# Patient Record
Sex: FEMALE | Race: WHITE | NOT HISPANIC OR LATINO | Employment: UNEMPLOYED | URBAN - NONMETROPOLITAN AREA
[De-identification: names, ages, dates, MRNs, and addresses within clinical notes are randomized per-mention and may not be internally consistent; named-entity substitution may affect disease eponyms.]

---

## 2019-05-15 ENCOUNTER — TRANSCRIBE ORDERS (OUTPATIENT)
Dept: GENERAL RADIOLOGY | Facility: HOSPITAL | Age: 29
End: 2019-05-15

## 2019-05-15 ENCOUNTER — HOSPITAL ENCOUNTER (OUTPATIENT)
Dept: GENERAL RADIOLOGY | Facility: HOSPITAL | Age: 29
Discharge: HOME | End: 2019-05-15
Admitting: PSYCHIATRY & NEUROLOGY

## 2019-05-15 DIAGNOSIS — M54.2 CERVICALGIA: ICD-10-CM

## 2019-05-15 DIAGNOSIS — M54.2 CERVICALGIA: Primary | ICD-10-CM

## 2019-05-15 PROCEDURE — 72050 X-RAY EXAM NECK SPINE 4/5VWS: CPT | Performed by: RADIOLOGY

## 2019-05-15 PROCEDURE — 72050 X-RAY EXAM NECK SPINE 4/5VWS: CPT

## 2021-04-15 ENCOUNTER — HOSPITAL ENCOUNTER (OUTPATIENT)
Dept: GENERAL RADIOLOGY | Facility: HOSPITAL | Age: 31
Discharge: HOME | End: 2021-04-15
Admitting: NURSE PRACTITIONER

## 2021-04-15 ENCOUNTER — TRANSCRIBE ORDERS (OUTPATIENT)
Dept: ADMINISTRATIVE | Facility: HOSPITAL | Age: 31
End: 2021-04-15

## 2021-04-15 DIAGNOSIS — M25.562 LEFT KNEE PAIN, UNSPECIFIED CHRONICITY: Primary | ICD-10-CM

## 2021-04-15 DIAGNOSIS — M25.562 LEFT KNEE PAIN, UNSPECIFIED CHRONICITY: ICD-10-CM

## 2021-04-15 PROCEDURE — 73564 X-RAY EXAM KNEE 4 OR MORE: CPT | Performed by: RADIOLOGY

## 2021-04-15 PROCEDURE — 73564 X-RAY EXAM KNEE 4 OR MORE: CPT

## 2021-05-19 ENCOUNTER — OFFICE VISIT (OUTPATIENT)
Dept: ORTHOPEDIC SURGERY | Facility: CLINIC | Age: 31
End: 2021-05-19

## 2021-05-19 VITALS
TEMPERATURE: 97.7 F | SYSTOLIC BLOOD PRESSURE: 150 MMHG | HEIGHT: 64 IN | DIASTOLIC BLOOD PRESSURE: 107 MMHG | BODY MASS INDEX: 43.71 KG/M2 | WEIGHT: 256 LBS | HEART RATE: 102 BPM

## 2021-05-19 DIAGNOSIS — S83.005A TRAUMATIC SUBLUXATION OF LEFT PATELLOFEMORAL JOINT, INITIAL ENCOUNTER: Primary | ICD-10-CM

## 2021-05-19 PROCEDURE — 99203 OFFICE O/P NEW LOW 30 MIN: CPT | Performed by: PHYSICIAN ASSISTANT

## 2021-05-19 RX ORDER — NAPROXEN 500 MG/1
TABLET ORAL
COMMUNITY
Start: 2021-02-12

## 2021-05-19 RX ORDER — IBUPROFEN 600 MG/1
600 TABLET ORAL 2 TIMES DAILY PRN
COMMUNITY
Start: 2021-04-13

## 2021-05-19 RX ORDER — DEXTROMETHORPHAN HYDROBROMIDE AND QUINIDINE SULFATE 20; 10 MG/1; MG/1
1 CAPSULE, GELATIN COATED ORAL 2 TIMES DAILY
COMMUNITY
Start: 2021-04-14

## 2021-05-19 RX ORDER — KETOROLAC TROMETHAMINE 10 MG/1
TABLET, FILM COATED ORAL
COMMUNITY
Start: 2021-05-16

## 2021-05-19 RX ORDER — BUSPIRONE HYDROCHLORIDE 15 MG/1
15 TABLET ORAL EVERY 6 HOURS PRN
COMMUNITY
Start: 2021-04-13

## 2021-05-19 RX ORDER — OMEPRAZOLE 20 MG/1
CAPSULE, DELAYED RELEASE ORAL
COMMUNITY
Start: 2021-02-12

## 2021-05-19 RX ORDER — SERTRALINE HYDROCHLORIDE 100 MG/1
100 TABLET, FILM COATED ORAL DAILY
COMMUNITY
Start: 2021-04-13

## 2021-05-19 RX ORDER — RIZATRIPTAN BENZOATE 10 MG/1
TABLET ORAL
COMMUNITY
Start: 2021-03-11

## 2021-05-19 RX ORDER — FREMANEZUMAB-VFRM 225 MG/1.5ML
INJECTION SUBCUTANEOUS
COMMUNITY
Start: 2021-05-01

## 2021-05-19 RX ORDER — FERROUS SULFATE TAB EC 324 MG (65 MG FE EQUIVALENT) 324 (65 FE) MG
TABLET DELAYED RESPONSE ORAL
COMMUNITY
Start: 2021-02-12

## 2021-05-19 RX ORDER — TIZANIDINE 4 MG/1
TABLET ORAL
COMMUNITY
Start: 2021-04-14

## 2021-05-19 RX ORDER — DOCUSATE SODIUM 100 MG
CAPSULE ORAL
COMMUNITY
Start: 2021-02-12

## 2021-05-19 NOTE — PROGRESS NOTES
[facility] [place]          Patient: Denisha Walker  YOB: 1990  Date of Encounter: 2021  PCP: Orlin Black MD      Subjective     Chief Complaint   Patient presents with   • Left Knee - Initial Evaluation, Pain, Edema           History of Present Illness:     Denisha Walker is a 31 y.o. female presents today secondary to left knee pain several weeks duration.  Patient does not recall specific injury however she describes an incident in which she was in a fixed extension state unable to fully flex her knee.  Subsequently following this she began to have noticeable progressive pain and swelling to which she presented to urgent care which radiographs were obtained and she was placed in a knee immobilizer.  Patient reports that she has had reduction of her swelling however she still has pain along the anterior medial aspect of her knee.  She reports difficulty upon repetitive flexion.  Patient reports pain upon twisting the knee.  No other conservative treatment options other than knee immobilization.  Denies any paresthesias        There is no problem list on file for this patient.    Past Medical History:   Diagnosis Date   • Anxiety    • [place]'s disease    • Depression    • GERD (gastroesophageal reflux disease)    • TBI (traumatic brain injury) (CMS/formerly [facility])      Past Surgical History:   Procedure Laterality Date   • ADENOIDECTOMY     •  SECTION     • DENTAL PROCEDURE     • GASTROSTOMY FEEDING TUBE INSERTION       Social History     Occupational History   • Not on file   Tobacco Use   • Smoking status: Never Smoker   • Smokeless tobacco: Never Used   Vaping Use   • Vaping Use: Never used   Substance and Sexual Activity   • Alcohol use: Yes     Comment: occasionally   • Drug use: Never   • Sexual activity: Defer    Denisha Walker  reports that she has never smoked. She has never used smokeless tobacco.. I have educated her on the risk of diseases  from using tobacco products such as cancer, COPD and heart disease.          Social History     Social History Narrative   • Not on file     Family History   Problem Relation Age of Onset   • Diabetes Father    • Cancer Maternal Grandmother    • Osteoarthritis Paternal Grandmother      Current Outpatient Medications   Medication Sig Dispense Refill   • Ajovy 225 MG/1.5ML solution prefilled syringe INJECT 1 SYRINGEFUL ONCE EVERY MONTH     • busPIRone (BUSPAR) 15 MG tablet Take 15 mg by mouth Every 6 (Six) Hours As Needed. for anxiety     • ferrous sulfate 324 (65 Fe) MG tablet delayed-release EC tablet      • ibuprofen (ADVIL,MOTRIN) 600 MG tablet Take 600 mg by mouth 2 (Two) Times a Day As Needed.     • ketorolac (TORADOL) 10 MG tablet TAKE 1 TABLET BY MOUTH TWICE DAILY FOR 5 DAYS     • naproxen (NAPROSYN) 500 MG tablet      • Nuedexta 20-10 MG capsule capsule Take 1 capsule by mouth 2 (Two) Times a Day.     • omeprazole (priLOSEC) 20 MG capsule      • rizatriptan (MAXALT) 10 MG tablet TAKE 1 TABLET BY MOUTH AS NEEDED AT ONSET OF HEADACHE     • sertraline (ZOLOFT) 100 MG tablet Take 100 mg by mouth Daily.     • Stool Softener 100 MG capsule      • tiZANidine (ZANAFLEX) 4 MG tablet TAKE 1 TO 2 TABLETS BY MOUTH THREE TIMES DAILY       No current facility-administered medications for this visit.     Allergies   Allergen Reactions   • Vancomycin Rash            Review of Systems   Constitutional: Negative.   HENT: Positive for sore throat.    Eyes: Negative.    Cardiovascular: Negative.    Respiratory: Positive for cough.    Endocrine: Negative.    Hematologic/Lymphatic: Negative.    Skin: Negative.    Musculoskeletal: Positive for joint pain and joint swelling.        Pertinent positives listed in HPI   Gastrointestinal: Negative.    Genitourinary: Negative.    Neurological: Negative.    Psychiatric/Behavioral: Positive for depression. The patient is nervous/anxious.    Allergic/Immunologic: Negative.           "        Objective      Vitals:    05/19/21 1300   BP: (!) 150/107   Pulse: 102   Temp: 97.7 °F (36.5 °C)   Weight: 116 kg (256 lb)   Height: 162.6 cm (64\")      Patient's Body mass index is 43.94 kg/m². indicating that she is morbidly obese (BMI > 40 or > 35 with obesity - related health condition).Obesity is unchanged.       Physical Exam  Vitals and nursing note reviewed.   Constitutional:       General: She is not in acute distress.     Appearance: She is not ill-appearing.   HENT:      Head: Normocephalic and atraumatic.      Right Ear: External ear normal.      Left Ear: External ear normal.      Nose: Nose normal. No congestion or rhinorrhea.   Eyes:      Extraocular Movements: Extraocular movements intact.      Conjunctiva/sclera: Conjunctivae normal.      Pupils: Pupils are equal, round, and reactive to light.   Cardiovascular:      Rate and Rhythm: Normal rate.      Pulses: Normal pulses.   Pulmonary:      Effort: Pulmonary effort is normal. No respiratory distress.      Breath sounds: No stridor.   Abdominal:      General: There is no distension.   Musculoskeletal:      Cervical back: Normal range of motion.      Comments: Left knee examination today reveals patellofemoral crepitus with scant effusion.  Medial retinacular tenderness with palpation.  Lateral J sign upon full extension.  No pain or instability upon stressing cruciate and collateral ligaments.  Ella's and Apley's grind test negative.  Remainder the neurovascular status grossly intact left lower extremity   Skin:     General: Skin is warm and dry.      Capillary Refill: Capillary refill takes less than 2 seconds.   Neurological:      General: No focal deficit present.      Mental Status: She is alert and oriented to person, place, and time.   Psychiatric:         Mood and Affect: Mood normal.         Behavior: Behavior normal.         Thought Content: Thought content normal.         Judgment: Judgment normal.                 Radiology:  "     XR Knee 4+ View Left    Result Date: 4/15/2021    No acute findings in the left knee.  This report was finalized on 4/15/2021 1:11 PM by Dr. Maury Rodriguez MD.              Assessment/Plan        ICD-10-CM ICD-9-CM   1. Traumatic subluxation of left patellofemoral joint, initial encounter  S83.005A 836.3       31-year-old female status post traumatic subluxation patellofemoral joint.  Patient has implemented immobilization with some reduction of swelling however she still has weakness and feelings of instability upon ambulation.  As result of this the patient was provided with a lateral J brace and asked to discontinue the knee immobilizer.  She will also implement formal outpatient physical therapy over the course of the next 6 weeks with aggressive measures for VMO strengthening stabilization.  She will return back in 4 weeks for further evaluation                  This document was signed by Nasir Nelson PA-C May 19, 2021     CC: Orlin Black MD       [facility] Dragon/Transcription disclaimer:  Part of this note may be completed utilizing the dragon speech recognition software. This electronic transcription/translation of spoken language to printed text may contain grammatical errors, random word insertions, pronoun errors, and incomplete sentences or occasional consequences of the system due to software limitations, ambient noise, and hardware issues.  Any questions or concerns about the content, text, or information contained within the body of this dictation should be directly addressed to the physician for clarification.           19-Jul-2019 00:45

## 2021-06-07 ENCOUNTER — TREATMENT (OUTPATIENT)
Dept: PHYSICAL THERAPY | Facility: CLINIC | Age: 31
End: 2021-06-07

## 2021-06-07 DIAGNOSIS — S83.005D TRAUMATIC SUBLUXATION OF LEFT PATELLOFEMORAL JOINT, SUBSEQUENT ENCOUNTER: Primary | ICD-10-CM

## 2021-06-07 PROCEDURE — 97162 PT EVAL MOD COMPLEX 30 MIN: CPT | Performed by: PHYSICAL THERAPIST

## 2021-06-07 NOTE — PROGRESS NOTES
"  Physical Therapy Initial Evaluation and Plan of Care      Patient: Denisha Walker   : 1990  Diagnosis/ICD-10 Code:  Traumatic subluxation of left patellofemoral joint, subsequent encounter [S83.005D]  Referring practitioner: IRWIN Melendez  Date of Initial Visit: 2021  Today's Date: 2021  Patient seen for 1 sessions           Subjective Questionnaire: LEFS: 29/80=63.75% impaired      Subjective Evaluation    History of Present Illness  Onset date: 1 month ago.  Mechanism of injury: Patient reports that she injured her left knee approximately 1 month ago.  She is unsure of how she injured her left knee, but notes that knee pain kept getting progressively worse.  She reports that she went to the ER in [place] and was told that \"her knee cap is falling to the side.\"  She was provided with a knee immobilizer and crutches.  Patient notes that she was instructed to see an orthopedic MD.  Patient was referred to PT for evaluation/treatment of left patellofemoral subluxation.  She reports that her knee has been swelling, especially with standing/walking extended lengths of time.  Patient notes that it is difficult to straighten her knee.  She estimates a 5 minute walking tolerance and a 5-10 minute standing tolerance.      Patient Occupation: Unemployed/Disabled Pain  Current pain ratin  At best pain ratin  At worst pain rating: 10  Location: L) knee  Quality: dull ache and discomfort  Relieving factors: rest, change in position and medications  Aggravating factors: ambulation, standing, squatting, prolonged positioning, movement and repetitive movement    Patient Goals  Patient goals for therapy: decreased pain             Objective          Tenderness   Left Knee   Tenderness in the inferior patella, lateral patella, medial joint line, medial patella, patellar tendon and superior patella.     Active Range of Motion   Left Knee   Flexion: 109 degrees   Extension: Left knee active extension: " lacking 5 degrees from full extension.     Right Knee   Flexion: 120 degrees   Extension: 0 degrees     Strength/Myotome Testing     Left Hip   Planes of Motion   Flexion: 4  Abduction: 4  Adduction: 4    Right Hip   Planes of Motion   Flexion: 4+  Abduction: 4+  Adduction: 4+    Left Knee   Flexion: 4-  Extension: 4-    Right Knee   Flexion: 4+  Extension: 4+    Tests     Left Knee   Negative anterior drawer, lateral Ella, medial Ella, posterior drawer, valgus stress test at 30 degrees and varus stress test at 30 degrees.     Swelling     Left Knee Girth Measurement (cm)   Joint line: 43 cm    Right Knee Girth Measurement (cm)   Joint line: 42 cm    Ambulation     Observational Gait   Gait: antalgic   Decreased left stance time and left step length.     Additional Observational Gait Details  Decreased weightbearing on the left LE.          Assessment & Plan     Assessment  Impairments: abnormal gait, abnormal muscle tone, abnormal or restricted ROM, activity intolerance, impaired balance, impaired physical strength, lacks appropriate home exercise program, pain with function and weight-bearing intolerance  Assessment details: Patient is a 31 year old female who comes to physical therapy for subluxation of left patellofemoral joint. The patient presents with increased pain, decreased left knee ROM, and decreased left LE strength. Special tests were negative at the left knee for meniscus pathology or ligamentous laxity.  Patient reports a 63.75% functional mobility impairment, based on the patient's response to the LEFS.  Patient will benefit from skilled PT, so that patient can achieve maximum level of function.     Prognosis: good  Functional Limitations: walking, pulling, pushing, uncomfortable because of pain, moving in bed, sitting, standing, stooping and unable to perform repetitive tasks  Goals  Plan Goals: SHORT TERM GOALS:  4 weeks       1. Patient will be independent/complaint with HEP.  2. Patient  will report pain no greater than 6/10 when performing self-care activities.  3. Pt to report being able to stand for 15 minutes with pain no greater than 6/10 in the left knee.  4. Patient will demonstrate 0-120 left knee ROM to allow for greater ease with ADLs.    LONG TERM GOALS:   8 weeks  1. Patient will report at least 40/80 on LEFS for improved independence.  2. Patient to demonstrate left LE strength to 4+/5 or greater to improve safety with ambulation on uneven surfaces.  3. Patient to report being able to walk for 20 minutes without increasing pain in the left knee.  4. Patient will report no more than 4/10 left knee pain for improved function.      Plan  Planned modality interventions: iontophoresis, cryotherapy, dry needling, electrical stimulation/Russian stimulation, TENS, thermotherapy (hydrocollator packs) and ultrasound  Planned therapy interventions: manual therapy, ADL retraining, balance/weight-bearing training, neuromuscular re-education, body mechanics training, postural training, soft tissue mobilization, flexibility, functional ROM exercises, strengthening, gait training, stretching, home exercise program, therapeutic activities, IADL retraining, transfer training and joint mobilization  Duration in visits: 16  Duration in weeks: 8  Treatment plan discussed with: patient  Plan details: Moderate Evaluation  90099  Re-evaluation   98866    Therapeutic exercise  68941  Therapeutic activity    27682  Neuromuscular re-education   97259  Manual therapy   87648  Gait training  68321    Attended e-stim  75482  Unattended e-stim (Medicaid/Medicare)     Moist heat/cryotherapy 57870   Ultrasound   93587  Iontophoresis   13497            Visit Diagnoses:    ICD-10-CM ICD-9-CM   1. Traumatic subluxation of left patellofemoral joint, subsequent encounter  S83.005D V58.89     836.3       Timed:  Manual Therapy:         mins  50727;  Therapeutic Exercise:         mins  30743;     Neuromuscular Heidy:         mins  11167;    Therapeutic Activity:          mins  43336;     Gait Training:           mins  83392;     Ultrasound:          mins  08445;    Electrical Stimulation:         mins  11565 ( );    Untimed:  Electrical Stimulation:         mins  77974 ( );  Mechanical Traction:         mins  26173;     Timed Treatment:      mins   Total Treatment:     30   mins    PT SIGNATURE: Halie Lehman, PT         Initial Certification  Certification Period: 6/7/2021 thru 9/5/2021  I certify that the therapy services are furnished while this patient is under my care.  The services outlined above are required by this patient, and will be reviewed every 90 days.     PHYSICIAN: Nasir Nelson PA      DATE:     Please sign and return via fax to [phone].. Thank you, [facility].

## 2021-06-11 ENCOUNTER — TREATMENT (OUTPATIENT)
Dept: PHYSICAL THERAPY | Facility: CLINIC | Age: 31
End: 2021-06-11

## 2021-06-11 DIAGNOSIS — S83.005D TRAUMATIC SUBLUXATION OF LEFT PATELLOFEMORAL JOINT, SUBSEQUENT ENCOUNTER: Primary | ICD-10-CM

## 2021-06-11 PROCEDURE — 97140 MANUAL THERAPY 1/> REGIONS: CPT | Performed by: PHYSICAL THERAPIST

## 2021-06-11 PROCEDURE — 97110 THERAPEUTIC EXERCISES: CPT | Performed by: PHYSICAL THERAPIST

## 2021-06-11 NOTE — PROGRESS NOTES
Patient: Denisha Walker   : 1990  Referring practitioner: No ref. provider found  Date of Initial Visit: Type: THERAPY  Noted: 2021  Today's Date: 2021  Patient seen for 2 sessions           Subjective Evaluation    History of Present Illness    Subjective comment: Pt reports having 5/10 pain today.       Objective   See Exercise, Manual, and Modality Logs for complete treatment.       Assessment & Plan     Assessment  Assessment details: Tx today consisted of there ex for improved knee stability and proprioceptive training followed by stm to knee and ice.  Pt responded well to exercises with good effort yet demonstrated impaired balance with tandem standing.  Pt reported 4/10 post pain.    Plan  Plan details: Will follow progressing knee stability and decreased pain .        Visit Diagnoses:    ICD-10-CM ICD-9-CM   1. Traumatic subluxation of left patellofemoral joint, subsequent encounter  S83.005D V58.89     836.3       Progress strengthening /stabilization /functional activity           Timed:  Manual Therapy:    12     mins  65921;  Therapeutic Exercise:    31     mins  55917;     Neuromuscular Heidy:        mins  40724;    Therapeutic Activity:          mins  92819;     Gait Training:           mins  71625;     Ultrasound:          mins  79625;    Electrical Stimulation:         mins  63617 ( );    Untimed:  Electrical Stimulation:         mins  44563 ( );  Mechanical Traction:         mins  33808;     Timed Treatment:   43   mins   Total Treatment:     49   mins  Augustus Tinajero, PT  Physical Therapist

## 2021-06-16 ENCOUNTER — TREATMENT (OUTPATIENT)
Dept: PHYSICAL THERAPY | Facility: CLINIC | Age: 31
End: 2021-06-16

## 2021-06-16 DIAGNOSIS — S83.005D TRAUMATIC SUBLUXATION OF LEFT PATELLOFEMORAL JOINT, SUBSEQUENT ENCOUNTER: Primary | ICD-10-CM

## 2021-06-16 PROCEDURE — 97140 MANUAL THERAPY 1/> REGIONS: CPT | Performed by: PHYSICAL THERAPIST

## 2021-06-16 PROCEDURE — 97110 THERAPEUTIC EXERCISES: CPT | Performed by: PHYSICAL THERAPIST

## 2021-06-16 PROCEDURE — 97112 NEUROMUSCULAR REEDUCATION: CPT | Performed by: PHYSICAL THERAPIST

## 2021-06-16 NOTE — PROGRESS NOTES
Physical Therapy Daily Treatment Note      Patient: Denisha Walker   : 1990  Referring practitioner: No ref. provider found  Date of Initial Visit: Type: THERAPY  Noted: 2021  Today's Date: 2021  Patient seen for 3 sessions         Subjective:  Patient arrives to therapy w/ reports of 4/10 left knee pain.  Pt states she tolerated last session well w/ no complaints.      Objective   See Exercise, Manual, and Modality Logs for complete treatment.       Assessment/Plan:  Patient tolerated treatment well today w/ reports of slight decrease in pain following, 2/10.  Session initiated w/ therex and neuro activities as listed for improved right knee range of motion, improved LE strength, and dynamic balance, as tolerated.  Additional strengthening exercises including bridges and standing marches added to program to assist w/ improved strength.  Pt required cues and demonstration while performing TE for good form and for max benefit.  Manual STM/ retrograde massage performed to L) knee to assist w/ pain and edema control.  Cryotherapy applied at conclusion.  Pt continues to benefit from therapy services and will be progressed as tolerated.  Continue w/ PT's POC.        Visit Diagnoses:    ICD-10-CM ICD-9-CM   1. Traumatic subluxation of left patellofemoral joint, subsequent encounter  S83.005D V58.89     836.3       Progress per Plan of Care and Progress strengthening /stabilization /functional activity           Timed:  Manual Therapy:    13     mins  41796;  Therapeutic Exercise:    30     mins  71556;     Neuromuscular Heidy:   11     mins  61624;    Therapeutic Activity:          mins  43172;     Gait Training:           mins  91048;     Ultrasound:          mins  78624;    Electrical Stimulation:         mins  12233 ( );    Untimed:  Electrical Stimulation:         mins  23947 ( );  Mechanical Traction:         mins  27468;     Timed Treatment:   54   mins   Total Treatment:     60    fransico Prince. Radha, SHARON  Physical Therapist

## 2021-06-21 ENCOUNTER — TELEPHONE (OUTPATIENT)
Dept: PHYSICAL THERAPY | Facility: CLINIC | Age: 31
End: 2021-06-21

## 2021-06-28 ENCOUNTER — TREATMENT (OUTPATIENT)
Dept: PHYSICAL THERAPY | Facility: CLINIC | Age: 31
End: 2021-06-28

## 2021-06-28 DIAGNOSIS — S83.005D TRAUMATIC SUBLUXATION OF LEFT PATELLOFEMORAL JOINT, SUBSEQUENT ENCOUNTER: Primary | ICD-10-CM

## 2021-06-28 PROCEDURE — 97110 THERAPEUTIC EXERCISES: CPT | Performed by: PHYSICAL THERAPIST

## 2021-06-28 PROCEDURE — 97530 THERAPEUTIC ACTIVITIES: CPT | Performed by: PHYSICAL THERAPIST

## 2021-06-28 NOTE — PROGRESS NOTES
Physical Therapy Daily Treatment Note      Patient: Denisha Walker   : 1990  Referring practitioner: IRWIN Melendez  Date of Initial Visit: Type: THERAPY  Noted: 2021  Today's Date: 2021  Patient seen for 4 sessions           Subjective Evaluation    History of Present Illness    Subjective comment: Patient notes that she has not been experiencing any knee pain recently.  She states that she has been wearing her knee brace less, since she has no pain.  Patient goes for an MD follow-up this week.Pain  Current pain ratin           Objective   See Exercise, Manual, and Modality Logs for complete treatment.       Assessment & Plan     Assessment  Assessment details: Patient tolerated today's session well, noting 0/10 pain pre- and post-tx.  There ex continued to focus on LE strengthening and knee ROM.  Patient progressed in standing activities to include mini squats, step taps, and standing hip abd/ext.  Patient required frequent cues during session for improved form and appropriate pacing with exercises.  Patient declined ice at conclusion of treatment session.  She will continue to be progressed per her tolerance and POC.        Visit Diagnoses:    ICD-10-CM ICD-9-CM   1. Traumatic subluxation of left patellofemoral joint, subsequent encounter  S83.005D V58.89     836.3       Progress per Plan of Care and Progress strengthening /stabilization /functional activity           Timed:  Manual Therapy:         mins  52628;  Therapeutic Exercise:    35     mins  27944;     Neuromuscular Heidy:        mins  59341;    Therapeutic Activity:     12     mins  85492;     Gait Training:           mins  21126;     Ultrasound:          mins  02055;    Electrical Stimulation:         mins  94403 ( );    Untimed:  Electrical Stimulation:         mins  07004 ( );  Mechanical Traction:         mins  65223;     Timed Treatment:   47   mins   Total Treatment:     47   mins  Halie Lehman,  PT  Physical Therapist

## 2021-06-30 ENCOUNTER — OFFICE VISIT (OUTPATIENT)
Dept: ORTHOPEDIC SURGERY | Facility: CLINIC | Age: 31
End: 2021-06-30

## 2021-06-30 VITALS — WEIGHT: 255.73 LBS | BODY MASS INDEX: 43.66 KG/M2 | HEIGHT: 64 IN

## 2021-06-30 DIAGNOSIS — S83.005A TRAUMATIC SUBLUXATION OF LEFT PATELLOFEMORAL JOINT, INITIAL ENCOUNTER: Primary | ICD-10-CM

## 2021-06-30 PROCEDURE — 99213 OFFICE O/P EST LOW 20 MIN: CPT | Performed by: PHYSICIAN ASSISTANT

## 2021-06-30 NOTE — PROGRESS NOTES
[facility] Orthopaedic Surgery Established Patient Visit          Patient: Denisha Walker  YOB: 1990  Date of Encounter: 2021  PCP: Orlin Black MD      Subjective     Chief Complaint   Patient presents with   • Left Knee - Follow-up           History of Present Illness:     Denisha Walker is a 31-year-old female presents today follow evaluation left knee pain traumatic patellofemoral subluxation.  Patient has implemented physical therapy with notable decrease in pain and symptoms.  Patient reports no significant worsening or recurrence.  She describes decreased swelling and she has remove the brace for gentle range of motion and activities without complication.  She reports no new complaint.  She has not been going to physical therapy over the course of 4 weeks    There is no problem list on file for this patient.    Past Medical History:   Diagnosis Date   • Anxiety    • Radha's disease    • Depression    • GERD (gastroesophageal reflux disease)    • TBI (traumatic brain injury) (CMS/Formerly [facility])      Past Surgical History:   Procedure Laterality Date   • ADENOIDECTOMY     •  SECTION     • DENTAL PROCEDURE     • GASTROSTOMY FEEDING TUBE INSERTION       Social History     Occupational History   • Not on file   Tobacco Use   • Smoking status: Never Smoker   • Smokeless tobacco: Never Used   Vaping Use   • Vaping Use: Never used   Substance and Sexual Activity   • Alcohol use: Yes     Comment: occasionally   • Drug use: Never   • Sexual activity: Defer    Denisha Walker  reports that she has never smoked. She has never used smokeless tobacco.. I have educated her on the risk of diseases from using tobacco products such as cancer, COPD and heart disease.          Social History     Social History Narrative   • Not on file     Family History   Problem Relation Age of Onset   • Diabetes Father    • Cancer Maternal Grandmother    • Osteoarthritis Paternal Grandmother      Current  "Outpatient Medications   Medication Sig Dispense Refill   • Ajovy 225 MG/1.5ML solution prefilled syringe INJECT 1 SYRINGEFUL ONCE EVERY MONTH     • busPIRone (BUSPAR) 15 MG tablet Take 15 mg by mouth Every 6 (Six) Hours As Needed. for anxiety     • ferrous sulfate 324 (65 Fe) MG tablet delayed-release EC tablet      • ibuprofen (ADVIL,MOTRIN) 600 MG tablet Take 600 mg by mouth 2 (Two) Times a Day As Needed.     • ketorolac (TORADOL) 10 MG tablet TAKE 1 TABLET BY MOUTH TWICE DAILY FOR 5 DAYS     • naproxen (NAPROSYN) 500 MG tablet      • Nuedexta 20-10 MG capsule capsule Take 1 capsule by mouth 2 (Two) Times a Day.     • omeprazole (priLOSEC) 20 MG capsule      • rizatriptan (MAXALT) 10 MG tablet TAKE 1 TABLET BY MOUTH AS NEEDED AT ONSET OF HEADACHE     • sertraline (ZOLOFT) 100 MG tablet Take 100 mg by mouth Daily.     • Stool Softener 100 MG capsule      • tiZANidine (ZANAFLEX) 4 MG tablet TAKE 1 TO 2 TABLETS BY MOUTH THREE TIMES DAILY       No current facility-administered medications for this visit.     Allergies   Allergen Reactions   • Vancomycin Rash            Review of Systems   Constitutional: Negative.   HENT: Positive for sore throat.    Eyes: Negative.    Cardiovascular: Negative.    Respiratory: Positive for cough.    Endocrine: Negative.    Hematologic/Lymphatic: Negative.    Skin: Negative.    Musculoskeletal: Positive for joint pain and joint swelling.        Pertinent positives listed in HPI   Gastrointestinal: Negative.    Genitourinary: Negative.    Neurological: Negative.    Psychiatric/Behavioral: Positive for depression. The patient is nervous/anxious.    Allergic/Immunologic: Negative.          Objective      Vitals:    06/30/21 1035   Weight: 116 kg (255 lb 11.7 oz)   Height: 162.6 cm (64.02\")      Patient's Body mass index is 43.87 kg/m². indicating that she is morbidly obese (BMI > 40 or > 35 with obesity - related health condition).Obesity is unchanged.       Physical Exam  Vitals and " nursing note reviewed.   Constitutional:       General: She is not in acute distress.     Appearance: She is not ill-appearing.   HENT:      Head: Normocephalic and atraumatic.      Right Ear: External ear normal.      Left Ear: External ear normal.      Nose: Nose normal. No congestion or rhinorrhea.   Eyes:      Extraocular Movements: Extraocular movements intact.      Conjunctiva/sclera: Conjunctivae normal.      Pupils: Pupils are equal, round, and reactive to light.   Cardiovascular:      Rate and Rhythm: Normal rate.      Pulses: Normal pulses.   Pulmonary:      Effort: Pulmonary effort is normal. No respiratory distress.      Breath sounds: No stridor.   Abdominal:      General: There is no distension.   Musculoskeletal:      Cervical back: Normal range of motion.      Comments: Left knee examination today reveals continued patellofemoral crepitus with scant effusion.  Medial retinacular nontender with palpation.  Lateral J sign upon full extension. VMO atrophy  No pain or instability upon stressing cruciate and collateral ligaments.  Ella's and Apley's grind test negative.  Remainder the neurovascular status grossly intact left lower extremity   Skin:     General: Skin is warm and dry.      Capillary Refill: Capillary refill takes less than 2 seconds.   Neurological:      General: No focal deficit present.      Mental Status: She is alert and oriented to person, place, and time.   Psychiatric:         Mood and Affect: Mood normal.         Behavior: Behavior normal.         Thought Content: Thought content normal.         Judgment: Judgment normal.                 Radiology:      XR Knee 4+ View Left    Result Date: 4/15/2021    No acute findings in the left knee.  This report was finalized on 4/15/2021 1:11 PM by Dr. Maury Rodriguez MD.              Assessment/Plan      No diagnosis found.      31-year-old female status post maximization to the femoral joint left knee.  Patient has a limited several weeks  of physical therapy with decreasing pain symptoms and increasing overall strength.  She is in the process of discontinuing and weaning out of the brace upon activity.  Patient for gentle manual physical therapy and return back on as-needed basis upon any further complication or worsening of her pain/symptoms.              This document was signed by Nasir Nelson PA-C June 30, 2021     CC: Orlin Black MD EMR Dragon/Transcription disclaimer:  Part of this note may be completed utilizing the dragon speech recognition software. This electronic transcription/translation of spoken language to printed text may contain grammatical errors, random word insertions, pronoun errors, and incomplete sentences or occasional consequences of the system due to software limitations, ambient noise, and hardware issues.  Any questions or concerns about the content, text, or information contained within the body of this dictation should be directly addressed to the physician for clarification.

## 2021-07-01 ENCOUNTER — TREATMENT (OUTPATIENT)
Dept: PHYSICAL THERAPY | Facility: CLINIC | Age: 31
End: 2021-07-01

## 2021-07-01 DIAGNOSIS — S83.005D TRAUMATIC SUBLUXATION OF LEFT PATELLOFEMORAL JOINT, SUBSEQUENT ENCOUNTER: Primary | ICD-10-CM

## 2021-07-01 PROCEDURE — 97112 NEUROMUSCULAR REEDUCATION: CPT | Performed by: PHYSICAL THERAPIST

## 2021-07-01 PROCEDURE — 97110 THERAPEUTIC EXERCISES: CPT | Performed by: PHYSICAL THERAPIST

## 2021-07-01 NOTE — PROGRESS NOTES
Physical Therapy Daily Treatment Note      Patient: Denisha Walker   : 1990  Referring practitioner: IRWIN Melendez  Date of Initial Visit: Type: THERAPY  Noted: 2021  Today's Date: 2021  Patient seen for 5 sessions         Subjective:  Patient arrives to therapy w/ reports of 3/10 left knee pain.  Pt states her knee is more sore today, however she notes of no change in activity or falls.     Objective   See Exercise, Manual, and Modality Logs for complete treatment.       Assessment/Plan:  Patient tolerated treatment well today w/ reports of slight decrease in knee pain following, 2/10. Pt completed therex and balance activities as listed w/ additional closed chain strengthening activities to program.  Pt provided w/ cues and demonstration w/ new added exercise for good form and mechanics.  Cryotherapy applied at conclusion.  Pt continues to benefit from therapy services to address goals, improve VMO strength, and reduce pain.  Continue w/ PT's POC.    Visit Diagnoses:    ICD-10-CM ICD-9-CM   1. Traumatic subluxation of left patellofemoral joint, subsequent encounter  S83.005D V58.89     836.3       Progress per Plan of Care and Progress strengthening /stabilization /functional activity           Timed:  Manual Therapy:         mins  76747;  Therapeutic Exercise:    38     mins  96960;     Neuromuscular Heidy:   10     mins  56031;    Therapeutic Activity:          mins  24176;     Gait Training:           mins  68243;     Ultrasound:          mins  97144;    Electrical Stimulation:         mins  04466 ( );    Untimed:  Electrical Stimulation:         mins  98773 ( );  Mechanical Traction:         mins  89815;     Timed Treatment:   48   mins   Total Treatment:    54    mins  Milagros Garcia PTA  Physical Therapist

## 2021-07-06 ENCOUNTER — TELEPHONE (OUTPATIENT)
Dept: PHYSICAL THERAPY | Facility: CLINIC | Age: 31
End: 2021-07-06

## 2021-07-13 ENCOUNTER — TREATMENT (OUTPATIENT)
Dept: PHYSICAL THERAPY | Facility: CLINIC | Age: 31
End: 2021-07-13

## 2021-07-13 DIAGNOSIS — S83.005D TRAUMATIC SUBLUXATION OF LEFT PATELLOFEMORAL JOINT, SUBSEQUENT ENCOUNTER: Primary | ICD-10-CM

## 2021-07-13 PROCEDURE — 97110 THERAPEUTIC EXERCISES: CPT | Performed by: PHYSICAL THERAPIST

## 2021-07-13 PROCEDURE — 97530 THERAPEUTIC ACTIVITIES: CPT | Performed by: PHYSICAL THERAPIST

## 2021-07-13 NOTE — PROGRESS NOTES
Treatment/Discharge    Patient: Denisha Walker   : 1990  Diagnosis/ICD-10 Code:  Traumatic subluxation of left patellofemoral joint, subsequent encounter [S83.005D]  Referring practitioner: IRWIN Melendez  Date of Initial Visit: Type: THERAPY  Noted: 2021  Today's Date: 2021  Patient seen for 6 sessions      Subjective:   Subjective Questionnaire: LEFS: 65/80=18.75% impaired  Clinical Progress: improved  Home Program Compliance: Yes    Subjective Evaluation    History of Present Illness    Subjective comment: Patient reports that she feels like her knee is getting stronger with physical therapy.  She notes that she has not been having to wear her knee brace as much.  Patient states that she continues to have increased knee pain when walking on unlevel ground.  Patient reports that she was released from her MD at last appt and will not have to return unless she has any complications.Pain  Current pain ratin  At best pain ratin  At worst pain ratin         Objective          Active Range of Motion   Left Knee   Flexion: 124 degrees   Extension: 0 degrees     Right Knee   Flexion: 120 degrees   Extension: 0 degrees     Strength/Myotome Testing     Left Hip   Planes of Motion   Flexion: 4+  Abduction: 4+  Adduction: 4+    Right Hip   Planes of Motion   Flexion: 4+  Abduction: 4+  Adduction: 4+    Left Knee   Flexion: 4+  Extension: 4+    Right Knee   Flexion: 4+  Extension: 4+      Assessment & Plan     Assessment  Assessment details: Patient has been attending physical therapy for subluxation of left patellofemoral joint; she has attended therapy for a total of 6 sessions, dating from 2021 to 2021.  Patient has shown improvements in LE strength and left knee ROM.  Patient also reports improved functional mobility, with patient reports a 45% improvement since start of care.  Patient will be discharged at this time, due to reaching maximum level of function.  Patient has met 4/4  STGs and 3/4 LTGs.  Patient is encouraged to continue with HEP following discharge from therapy.      Goals  Plan Goals: SHORT TERM GOALS:  4 weeks       1. Patient will be independent/complaint with HEP.  Met  2. Patient will report pain no greater than 6/10 when performing self-care activities.  Met-0/10  3. Pt to report being able to stand for 15 minutes with pain no greater than 6/10 in the left knee.  Met-2/10  4. Patient will demonstrate 0-120 left knee ROM to allow for greater ease with ADLs.  Met    LONG TERM GOALS:   8 weeks  1. Patient will report at least 40/80 on LEFS for improved independence.  Met-65/80  2. Patient to demonstrate left LE strength to 4+/5 or greater to improve safety with ambulation on uneven surfaces.  Met  3. Patient to report being able to walk for 20 minutes without increasing pain in the left knee.  Met  4. Patient will report no more than 4/10 left knee pain for improved function.  Not met    Plan  Treatment plan discussed with: patient  Plan details: Patient will be discharged at conclusion of today's session.        Visit Diagnoses:    ICD-10-CM ICD-9-CM   1. Traumatic subluxation of left patellofemoral joint, subsequent encounter  S83.005D V58.89     836.3       Progress toward previous goals: Partially Met    Recommendations: Discharge    PT Signature: Halie Lehman PT      Based upon review of the patient's progress and continued therapy plan, it is my medical opinion that Denisha Walker should continue physical therapy treatment at [facility] PHYSICAL THERAPY  [address]  [facility] [address] [phone].    Signature: __________________________________  Nasir Nelson PA    Timed:  Manual Therapy:         mins  82578;  Therapeutic Exercise:     36    mins  61096;     Neuromuscular Heidy:        mins  38785;    Therapeutic Activity:     10     mins  61391;     Gait Training:           mins  04358;     Ultrasound:           mins  37746;    Electrical Stimulation:         mins  67524 ( );    Untimed:  Electrical Stimulation:         mins  37924 ( );  Mechanical Traction:         mins  71298;     Timed Treatment:   46  mins   Total Treatment:     46   mins

## 2022-03-08 ENCOUNTER — OFFICE VISIT (OUTPATIENT)
Dept: ORTHOPEDIC SURGERY | Facility: CLINIC | Age: 32
End: 2022-03-08

## 2022-03-08 VITALS — HEIGHT: 64 IN | WEIGHT: 256 LBS | BODY MASS INDEX: 43.71 KG/M2

## 2022-03-08 DIAGNOSIS — S83.005A TRAUMATIC SUBLUXATION OF LEFT PATELLOFEMORAL JOINT, INITIAL ENCOUNTER: Primary | ICD-10-CM

## 2022-03-08 DIAGNOSIS — G89.29 CHRONIC PATELLOFEMORAL PAIN OF LEFT KNEE: ICD-10-CM

## 2022-03-08 DIAGNOSIS — M25.562 CHRONIC PATELLOFEMORAL PAIN OF LEFT KNEE: ICD-10-CM

## 2022-03-08 PROCEDURE — 99213 OFFICE O/P EST LOW 20 MIN: CPT | Performed by: PHYSICIAN ASSISTANT

## 2022-03-08 NOTE — PROGRESS NOTES
[facility] [place] Orthopaedic Surgery Established Patient Visit          Patient: Denisha Walker  YOB: 1990  Date of Encounter: 3/8/2022  PCP: Orlin Black MD      Subjective     Chief Complaint   Patient presents with   • Left Knee - Pain           History of Present Illness:     Denisha Walker is a 31-year-old female presents today follow evaluation left knee pain traumatic patellofemoral subluxation pain syndrome.  Patient had previously implemented physical therapy with notable decrease in pain and symptoms. Over the last several weeks she has began to have worsening/recurrence of her patellofemoral knee pain. She has discontinued her home exercises. She describes usage of the  brace for gentle range of motion and activities. She reports no other new complaint.     There is no problem list on file for this patient.    Past Medical History:   Diagnosis Date   • Anxiety    • Radha's disease    • Depression    • GERD (gastroesophageal reflux disease)    • TBI (traumatic brain injury) (Formerly [facility])      Past Surgical History:   Procedure Laterality Date   • ADENOIDECTOMY     •  SECTION     • DENTAL PROCEDURE     • GASTROSTOMY FEEDING TUBE INSERTION       Social History     Occupational History   • Not on file   Tobacco Use   • Smoking status: Never Smoker   • Smokeless tobacco: Never Used   Vaping Use   • Vaping Use: Never used   Substance and Sexual Activity   • Alcohol use: Yes     Comment: occasionally   • Drug use: Never   • Sexual activity: Defer    Denisha Walker  reports that she has never smoked. She has never used smokeless tobacco.. I have educated her on the risk of diseases from using tobacco products such as cancer, COPD and heart disease.          Social History     Social History Narrative   • Not on file     Family History   Problem Relation Age of Onset   • Diabetes Father    • Cancer Maternal Grandmother    • Osteoarthritis Paternal Grandmother      Current Outpatient  "Medications   Medication Sig Dispense Refill   • Ajovy 225 MG/1.5ML solution prefilled syringe INJECT 1 SYRINGEFUL ONCE EVERY MONTH     • busPIRone (BUSPAR) 15 MG tablet Take 15 mg by mouth Every 6 (Six) Hours As Needed. for anxiety     • ferrous sulfate 324 (65 Fe) MG tablet delayed-release EC tablet      • ibuprofen (ADVIL,MOTRIN) 600 MG tablet Take 600 mg by mouth 2 (Two) Times a Day As Needed.     • ketorolac (TORADOL) 10 MG tablet TAKE 1 TABLET BY MOUTH TWICE DAILY FOR 5 DAYS     • naproxen (NAPROSYN) 500 MG tablet      • Nuedexta 20-10 MG capsule capsule Take 1 capsule by mouth 2 (Two) Times a Day.     • omeprazole (priLOSEC) 20 MG capsule      • rizatriptan (MAXALT) 10 MG tablet TAKE 1 TABLET BY MOUTH AS NEEDED AT ONSET OF HEADACHE     • sertraline (ZOLOFT) 100 MG tablet Take 100 mg by mouth Daily.     • Stool Softener 100 MG capsule      • tiZANidine (ZANAFLEX) 4 MG tablet TAKE 1 TO 2 TABLETS BY MOUTH THREE TIMES DAILY       No current facility-administered medications for this visit.     Allergies   Allergen Reactions   • Vancomycin Rash            Review of Systems   Constitutional: Negative.   HENT: Positive for sore throat.    Eyes: Negative.    Cardiovascular: Negative.    Respiratory: Positive for cough.    Endocrine: Negative.    Hematologic/Lymphatic: Negative.    Skin: Negative.    Musculoskeletal: Positive for joint pain and joint swelling.        Pertinent positives listed in HPI   Gastrointestinal: Negative.    Genitourinary: Negative.    Neurological: Negative.    Psychiatric/Behavioral: Positive for depression. The patient is nervous/anxious.    Allergic/Immunologic: Negative.          Objective      Vitals:    03/08/22 0904   Weight: 116 kg (256 lb)   Height: 162.6 cm (64.02\")      Patient's Body mass index is 43.91 kg/m². indicating that she is morbidly obese (BMI > 40 or > 35 with obesity - related health condition).Obesity is unchanged.       Physical Exam  Vitals and nursing note reviewed. "   Constitutional:       General: She is not in acute distress.     Appearance: She is not ill-appearing.   HENT:      Head: Normocephalic and atraumatic.      Right Ear: External ear normal.      Left Ear: External ear normal.      Nose: Nose normal. No congestion or rhinorrhea.   Eyes:      Extraocular Movements: Extraocular movements intact.      Conjunctiva/sclera: Conjunctivae normal.      Pupils: Pupils are equal, round, and reactive to light.   Cardiovascular:      Rate and Rhythm: Normal rate.      Pulses: Normal pulses.   Pulmonary:      Effort: Pulmonary effort is normal. No respiratory distress.      Breath sounds: No stridor.   Abdominal:      General: There is no distension.   Musculoskeletal:      Cervical back: Normal range of motion.      Comments: Left knee examination today reveals continued patellofemoral crepitus with scant effusion.  Medial retinacular nontender with palpation.  Lateral J sign upon full extension. VMO atrophy  No pain or instability upon stressing cruciate and collateral ligaments.  Ella's and Apley's grind test negative.  Remainder the neurovascular status grossly intact left lower extremity   Skin:     General: Skin is warm and dry.      Capillary Refill: Capillary refill takes less than 2 seconds.   Neurological:      General: No focal deficit present.      Mental Status: She is alert and oriented to person, place, and time.   Psychiatric:         Mood and Affect: Mood normal.         Behavior: Behavior normal.         Thought Content: Thought content normal.         Judgment: Judgment normal.             Assessment/Plan        ICD-10-CM ICD-9-CM   1. Traumatic subluxation of left patellofemoral joint, initial encounter  S83.005A 836.3   2. Chronic patellofemoral pain of left knee  M25.562 719.46    G89.29 338.29         32-year-old female with chronic patellofemoral knee pain/subluxation. Patient has had return of pain and symptoms upon discontinuing home exercises.   Patient was provided with an order for formal outpatient physical therapy modalities as deemed appropriate to reduce pain symptoms.  She will be progressed to a structured progressive home program.  She return back in 4 weeks for evaluation.  Discussed further conservative treatment options such as injections, bracing, etc.             This document was signed by Nasir Nelson PA-C March 8, 2022     CC: Orlin Black MD EMR Dragon/Transcription disclaimer:  Part of this note may be completed utilizing the dragon speech recognition software. This electronic transcription/translation of spoken language to printed text may contain grammatical errors, random word insertions, pronoun errors, and incomplete sentences or occasional consequences of the system due to software limitations, ambient noise, and hardware issues.  Any questions or concerns about the content, text, or information contained within the body of this dictation should be directly addressed to the physician for clarification.

## 2022-03-21 ENCOUNTER — TREATMENT (OUTPATIENT)
Dept: PHYSICAL THERAPY | Facility: CLINIC | Age: 32
End: 2022-03-21

## 2022-03-21 DIAGNOSIS — S83.005A TRAUMATIC SUBLUXATION OF LEFT PATELLOFEMORAL JOINT, INITIAL ENCOUNTER: Primary | ICD-10-CM

## 2022-03-21 PROCEDURE — 97162 PT EVAL MOD COMPLEX 30 MIN: CPT | Performed by: PHYSICAL THERAPIST

## 2022-03-21 NOTE — PROGRESS NOTES
Physical Therapy Initial Evaluation and Plan of Care    Patient: Denisha Walker   : 1990  Diagnosis/ICD-10 Code:  Traumatic subluxation of left patellofemoral joint, initial encounter [S83.005A]  Referring practitioner: IRWIN Melendez  Date of Initial Visit: 3/21/2022  Today's Date: 3/21/2022  Patient seen for 1 session         Visit Diagnoses:    ICD-10-CM ICD-9-CM   1. Traumatic subluxation of left patellofemoral joint, initial encounter  S83.005A 836.3         Subjective Questionnaire: LEFS: 57/80=28.75% impaired      Subjective Evaluation    History of Present Illness  Onset date: 1 month.  Mechanism of injury: Patient reports that she has been experiencing left knee pain for approximately 1 month.  She notes that she has no new injury to her knee, but states that this is a flare-up of the patellar subluxation she experienced in May 2021.  Patient participated with PT immediately following the initial injury, but has stopped performing her HEP.  Patient notes no ROM limitations or deficits in strength.  She states that she believes that she has noticed some swelling in the left knee.  She notes increased pain with standing or walking for extended lengths of time.      Patient Occupation: Unemployed Pain  Current pain ratin  At best pain ratin  At worst pain ratin  Location: L) knee  Quality: dull ache  Relieving factors: rest, change in position and medications  Aggravating factors: standing and ambulation    Diagnostic Tests  No diagnostic tests performed    Patient Goals  Patient goals for therapy: decreased pain             Objective          Tenderness   Left Knee   Tenderness in the inferior patella, medial joint line and patellar tendon.     Active Range of Motion   Left Knee   Flexion: 125 degrees   Extension: 0 degrees     Right Knee   Flexion: 125 degrees   Extension: 0 degrees     Strength/Myotome Testing     Left Hip   Planes of Motion   Flexion: 4  Abduction:  4  Adduction: 4    Right Hip   Planes of Motion   Flexion: 4+  Abduction: 4+  Adduction: 4+    Left Knee   Flexion: 4  Extension: 4    Right Knee   Flexion: 4+  Extension: 4+    Tests     Left Knee   Negative anterior drawer, lateral Ella, medial Ella, posterior drawer, valgus stress test at 30 degrees and varus stress test at 30 degrees.     Swelling     Left Knee Girth Measurement (cm)   Joint line: 46 cm    Right Knee Girth Measurement (cm)   Joint line: 45 cm          Assessment & Plan     Assessment  Impairments: abnormal gait, activity intolerance, impaired physical strength, lacks appropriate home exercise program and pain with function  Functional Limitations: walking, pulling, pushing, uncomfortable because of pain, standing and unable to perform repetitive tasks  Assessment details: Patient is a 32 year old female who comes to physical therapy for rehabilitation following traumatic subluxation of left patellofemoral joint. The patient presents with increased pain and decreased left LE strength. Special tests were negative for ligamentous laxity and meniscus pathology.  Patient reports a 28.75% functional mobility impairment, based on the patient's response to the LEFS.  Patient will benefit from skilled PT, so that patient can achieve maximum level of function.     Prognosis: good    Goals  Plan Goals: SHORT TERM GOALS:  4 weeks       1. Patient will be independent/complaint with HEP.  2. Patient will report pain no greater than 4/10 when performing self-care activities.  3. Pt to report being able to stand for 20 minutes with pain no greater than 4/10 in the left knee.    LONG TERM GOALS:   8 weeks  1. Patient will report at least 65/80 on LEFS for improved independence.  2. Patient to demonstrate left LE strength to 4+/5 or greater to improve safety with ambulation on uneven surfaces.  3. Patient to report being able to walk for 30 minutes without increasing pain in the left knee.  4. Patient  will report no more than 3/10 left knee pain for improved function.  5. Patient will be able to perform 10 mini squats with no increase in pain to allow for greater ease when performing household chores.      Plan  Therapy options: will be seen for skilled therapy services  Planned modality interventions: cryotherapy, electrical stimulation/Russian stimulation, TENS, thermotherapy (hydrocollator packs) and ultrasound  Planned therapy interventions: manual therapy, ADL retraining, balance/weight-bearing training, neuromuscular re-education, body mechanics training, postural training, soft tissue mobilization, flexibility, spinal/joint mobilization, functional ROM exercises, strengthening, gait training, stretching, home exercise program, therapeutic activities, IADL retraining, transfer training and joint mobilization  Frequency: 2x week  Duration in weeks: 8  Treatment plan discussed with: patient  Plan details: Moderate Evaluation  80413  Re-evaluation   36999    Therapeutic exercise  65133  Therapeutic activity    06641  Neuromuscular re-education   61088  Manual therapy   39921  Gait training  90604    Unattended e-stim (Medicaid/Medicare)     Moist heat/cryotherapy 96915   Ultrasound   82609            History # of Personal Factors and/or Comorbidities: MODERATE (1-2)  Examination of Body System(s): # of elements: MODERATE (3)  Clinical Presentation: STABLE   Clinical Decision Making: MODERATE      Timed:         Manual Therapy:         mins  11133;     Therapeutic Exercise:         mins  11597;     Neuromuscular Heidy:        mins  60505;    Therapeutic Activity:          mins  35997;     Gait Training:           mins  46811;     Ultrasound:          mins  22796;    Ionto                                   mins   60636  Self Care                            mins   47899  Canalith Repos         mins 30176      Un-Timed:  Electrical Stimulation:         mins  33199 ( );  Dry Needling          mins  self-pay  Traction          mins 89032  Low Eval          Mins  01801  Mod Eval     32     Mins  53282  High Eval                            Mins  39733        Timed Treatment:      mins   Total Treatment:     32   mins          PT: Halie Lehman, PT     License Number: [id number]  Electronically signed by Halie Lehman, PT, 03/21/22, 10:26 AM EDT    Certification Period: 3/21/2022 thru 6/18/2022  I certify that the therapy services are furnished while this patient is under my care.  The services outlined above are required by this patient, and will be reviewed every 90 days.         Physician Signature:__________________________________________________    PHYSICIAN: Nasir Nelson PA  NPI: [id number]                                      DATE:      Please sign and return via fax to [email or web address] . Thank you, [facility].

## 2022-03-22 ENCOUNTER — TREATMENT (OUTPATIENT)
Dept: PHYSICAL THERAPY | Facility: CLINIC | Age: 32
End: 2022-03-22

## 2022-03-22 DIAGNOSIS — S83.005A TRAUMATIC SUBLUXATION OF LEFT PATELLOFEMORAL JOINT, INITIAL ENCOUNTER: Primary | ICD-10-CM

## 2022-03-22 PROCEDURE — 97110 THERAPEUTIC EXERCISES: CPT | Performed by: PHYSICAL THERAPIST

## 2022-03-22 PROCEDURE — 97140 MANUAL THERAPY 1/> REGIONS: CPT | Performed by: PHYSICAL THERAPIST

## 2022-03-22 NOTE — PROGRESS NOTES
Physical Therapy Daily Treatment Note      Patient: Denisha Walker   : 1990  Referring practitioner: IRWIN Melendez  Date of Initial Visit: Type: THERAPY  Noted: 3/21/2022  Today's Date: 3/22/2022  Patient seen for 2 sessions       Visit Diagnoses:    ICD-10-CM ICD-9-CM   1. Traumatic subluxation of left patellofemoral joint, initial encounter  S83.005A 836.3       Subjective:  Patient arrives to therapy w/ reports of 1/10 left knee pain.  Pt verbalizes compliance of initiating home program w/ no complaints.       Objective   See Exercise, Manual, and Modality Logs for complete treatment.       Assessment/Plan:  Patient responded well to today's session w/ no complaints of increase in knee pain following, 1/10.  Treatment initiated w/ therex as listed for improved quad strength, improved left LE strength, and knee stability as tolerated.  Pt provided w/ cues and demonstration while completing exercise for good form, and for max benefit.  Manual soft tissue mobilization performed to left knee and surrounding tissues to address tightness, and assist w/ improved mobility. Pt observed w/ good tolerance. Cryotherapy applied at conclusion.  No signs of distress or adverse reactions observed during, and/ or following tx.  Pt continues to benefit from therapy services to address goals, improve strength, and reduce pain.  Continue w/ PT's POC.       Timed:         Manual Therapy:    14     mins  30543;     Therapeutic Exercise:    30     mins  80650;     Neuromuscular Heidy:        mins  51425;    Therapeutic Activity:          mins  87800;     Gait Training:           mins  73758;     Ultrasound:          mins  24437;    Ionto                                   mins   79895  Self Care                            mins   87543  Canalith Repos         mins 92628      Un-Timed:  Electrical Stimulation:         mins  31374 ( );  Dry Needling          mins self-pay  Traction          mins 01805      Timed  Treatment:   44   mins   Total Treatment:    50    mins    Milagros Prince. Radha PTA  [place] License: [id number]

## 2022-03-29 ENCOUNTER — TREATMENT (OUTPATIENT)
Dept: PHYSICAL THERAPY | Facility: CLINIC | Age: 32
End: 2022-03-29

## 2022-03-29 DIAGNOSIS — S83.005D TRAUMATIC SUBLUXATION OF LEFT PATELLOFEMORAL JOINT, SUBSEQUENT ENCOUNTER: ICD-10-CM

## 2022-03-29 DIAGNOSIS — S83.005A TRAUMATIC SUBLUXATION OF LEFT PATELLOFEMORAL JOINT, INITIAL ENCOUNTER: Primary | ICD-10-CM

## 2022-03-29 PROCEDURE — 97140 MANUAL THERAPY 1/> REGIONS: CPT | Performed by: PHYSICAL THERAPIST

## 2022-03-29 PROCEDURE — 97110 THERAPEUTIC EXERCISES: CPT | Performed by: PHYSICAL THERAPIST

## 2022-03-29 PROCEDURE — 97035 APP MDLTY 1+ULTRASOUND EA 15: CPT | Performed by: PHYSICAL THERAPIST

## 2022-03-29 NOTE — PROGRESS NOTES
Physical Therapy Daily Treatment Note      Patient: Denisha Walker   : 1990  Referring practitioner: IRWIN Melendez  Date of Initial Visit: Type: THERAPY  Noted: 3/21/2022  Today's Date: 3/29/2022  Patient seen for 3 sessions       Visit Diagnoses:    ICD-10-CM ICD-9-CM   1. Traumatic subluxation of left patellofemoral joint, initial encounter  S83.005A 836.3   2. Traumatic subluxation of left patellofemoral joint, subsequent encounter  S83.005D V58.89     836.3       Subjective Evaluation    History of Present Illness    Subjective comment: Pt has no complaints of pain today.       Objective   See Exercise, Manual, and Modality Logs for complete treatment.       Assessment & Plan     Assessment    Assessment details: Tx today consisted of there ex progressed with increased reps and added standing exercises and bike; followed by stm to medial and lateral knee and ended with US to medial left knee for decreased edema.  Pt responded well to tx today with reports of no pain following session.    Plan  Plan details: Will follow for improved leg stability and decreased pain in order to improve ADLs.  May cont to progress standing exercises next session.          Timed:         Manual Therapy:    14     mins  43544;     Therapeutic Exercise:    31     mins  61449;     Neuromuscular Heidy:        mins  90895;    Therapeutic Activity:          mins  74458;     Gait Training:           mins  25033;     Ultrasound:     8     mins  40838;    Ionto                                   mins   90791  Self Care                            mins   62031  Canalith Repos         mins 90345      Un-Timed:  Electrical Stimulation:         mins  16035 (MC );  Dry Needling          mins self-pay  Traction          mins 24478      Timed Treatment:   53   mins   Total Treatment:     53   mins    Augustus Tinajero PT  [place] License: [id number]    Electronically signed by Augustus Tinajero PT, 22, 12:46 PM EDT

## 2022-12-15 ENCOUNTER — TRANSCRIBE ORDERS (OUTPATIENT)
Dept: ADMINISTRATIVE | Facility: HOSPITAL | Age: 32
End: 2022-12-15

## 2022-12-15 ENCOUNTER — HOSPITAL ENCOUNTER (OUTPATIENT)
Dept: GENERAL RADIOLOGY | Facility: HOSPITAL | Age: 32
Discharge: HOME | End: 2022-12-15
Admitting: NURSE PRACTITIONER

## 2022-12-15 DIAGNOSIS — M79.672 LEFT FOOT PAIN: Primary | ICD-10-CM

## 2022-12-15 DIAGNOSIS — M79.672 LEFT FOOT PAIN: ICD-10-CM

## 2022-12-15 PROCEDURE — 73630 X-RAY EXAM OF FOOT: CPT

## 2022-12-15 PROCEDURE — 73630 X-RAY EXAM OF FOOT: CPT | Performed by: RADIOLOGY

## 2025-01-21 ENCOUNTER — OFFICE VISIT (OUTPATIENT)
Dept: SURGERY | Facility: CLINIC | Age: 35
End: 2025-01-21
Payer: COMMERCIAL

## 2025-01-21 VITALS — WEIGHT: 242 LBS | BODY MASS INDEX: 41.32 KG/M2 | HEIGHT: 64 IN

## 2025-01-21 DIAGNOSIS — L98.9 SKIN LESION: Primary | ICD-10-CM

## 2025-01-21 PROCEDURE — 99202 OFFICE O/P NEW SF 15 MIN: CPT | Performed by: SURGERY

## 2025-01-21 PROCEDURE — 1159F MED LIST DOCD IN RCRD: CPT | Performed by: SURGERY

## 2025-01-21 PROCEDURE — 1160F RVW MEDS BY RX/DR IN RCRD: CPT | Performed by: SURGERY

## 2025-01-21 RX ORDER — SEMAGLUTIDE 1.34 MG/ML
INJECTION, SOLUTION SUBCUTANEOUS WEEKLY
COMMUNITY

## 2025-01-21 RX ORDER — MELOXICAM 7.5 MG/1
7.5 TABLET ORAL DAILY
COMMUNITY

## 2025-01-22 ENCOUNTER — DOCUMENTATION (OUTPATIENT)
Dept: SURGERY | Facility: CLINIC | Age: 35
End: 2025-01-22
Payer: COMMERCIAL

## 2025-02-03 ENCOUNTER — OFFICE VISIT (OUTPATIENT)
Dept: SURGERY | Facility: CLINIC | Age: 35
End: 2025-02-03
Payer: COMMERCIAL

## 2025-02-03 VITALS — HEIGHT: 64 IN | BODY MASS INDEX: 41.32 KG/M2 | WEIGHT: 242 LBS

## 2025-02-03 DIAGNOSIS — L98.9 SKIN LESION: Primary | ICD-10-CM

## 2025-02-03 DIAGNOSIS — L98.9 SKIN LESION: ICD-10-CM

## 2025-02-03 PROCEDURE — 1160F RVW MEDS BY RX/DR IN RCRD: CPT | Performed by: SURGERY

## 2025-02-03 PROCEDURE — 1159F MED LIST DOCD IN RCRD: CPT | Performed by: SURGERY

## 2025-02-03 PROCEDURE — 11400 EXC TR-EXT B9+MARG 0.5 CM<: CPT | Performed by: SURGERY

## 2025-02-03 NOTE — PROGRESS NOTES
Subjective   Denisha Walker is a 35 y.o. female.     Chief Complaint: right thigh skin lesion    History of Present Illness She is a 36 yo who has a skin lesion on the thigh to be excised.    The following portions of the patient's history were reviewed and updated as appropriate: current medications, past family history, past medical history, past social history, past surgical history and problem list.    Review of Systems    Objective   Physical Exam excised a 5 mm pigmented lesion on the left anterior thigh with lidocaine and nylon sutures.    Past Medical History:   Diagnosis Date    Anxiety     Radha's disease     Brain injury     Depression     GERD (gastroesophageal reflux disease)     TBI (traumatic brain injury)        Family History   Problem Relation Age of Onset    Diabetes Father     Cancer Maternal Grandmother     Osteoarthritis Paternal Grandmother        Social History     Tobacco Use    Smoking status: Never     Passive exposure: Never    Smokeless tobacco: Never   Vaping Use    Vaping status: Never Used   Substance Use Topics    Alcohol use: Yes     Comment: occasionally    Drug use: Never       Past Surgical History:   Procedure Laterality Date    ADENOIDECTOMY       SECTION      DENTAL PROCEDURE      GASTROSTOMY FEEDING TUBE INSERTION      NASAL SINUS SURGERY      SCAR REVISION         Current Outpatient Medications   Medication Instructions    Ajovy 225 MG/1.5ML solution prefilled syringe INJECT 1 SYRINGEFUL ONCE EVERY MONTH    busPIRone (BUSPAR) 15 mg, Every 6 Hours PRN    ferrous sulfate 324 (65 Fe) MG tablet delayed-release EC tablet No dose, route, or frequency recorded.    ibuprofen (ADVIL,MOTRIN) 600 mg, 2 Times Daily PRN    ketorolac (TORADOL) 10 MG tablet TAKE 1 TABLET BY MOUTH TWICE DAILY FOR 5 DAYS    meloxicam (MOBIC) 7.5 mg, Daily    naproxen (NAPROSYN) 500 MG tablet No dose, route, or frequency recorded.    Nuedexta 20-10 MG capsule capsule 1 capsule, 2 Times Daily     omeprazole (priLOSEC) 20 MG capsule No dose, route, or frequency recorded.    rizatriptan (MAXALT) 10 MG tablet TAKE 1 TABLET BY MOUTH AS NEEDED AT ONSET OF HEADACHE    Semaglutide,0.25 or 0.5MG/DOS, (Ozempic, 0.25 or 0.5 MG/DOSE,) 2 MG/1.5ML solution pen-injector Weekly    sertraline (ZOLOFT) 100 mg, Daily    Stool Softener 100 MG capsule No dose, route, or frequency recorded.    tiZANidine (ZANAFLEX) 4 MG tablet TAKE 1 TO 2 TABLETS BY MOUTH THREE TIMES DAILY         Assessment & Plan   Diagnoses and all orders for this visit:    1. Skin lesion (Primary)    Remove sutures 1 wk             This document has been electronically signed by Edison Syed MD   February 3, 2025 10:46 EST

## 2025-02-06 LAB — REF LAB TEST METHOD: NORMAL

## 2025-02-10 ENCOUNTER — OFFICE VISIT (OUTPATIENT)
Dept: SURGERY | Facility: CLINIC | Age: 35
End: 2025-02-10
Payer: COMMERCIAL

## 2025-02-10 VITALS — HEIGHT: 64 IN | BODY MASS INDEX: 41.32 KG/M2 | WEIGHT: 242 LBS

## 2025-02-10 DIAGNOSIS — L98.9 SKIN LESION: Primary | ICD-10-CM

## 2025-02-10 PROCEDURE — 99024 POSTOP FOLLOW-UP VISIT: CPT | Performed by: SURGERY

## 2025-02-10 PROCEDURE — 1160F RVW MEDS BY RX/DR IN RCRD: CPT | Performed by: SURGERY

## 2025-02-10 PROCEDURE — 1159F MED LIST DOCD IN RCRD: CPT | Performed by: SURGERY

## 2025-02-10 NOTE — PROGRESS NOTES
Subjective   Denisha Walker is a 35 y.o. female.     Chief Complaint: post skin lesion excision.    History of Present Illness She is a 34 yo who had a skin lesion excised last week from the thigh. It was a dermatofibroma that was benign.    The following portions of the patient's history were reviewed and updated as appropriate: current medications, past family history, past medical history, past social history, past surgical history and problem list.    Review of Systems    Objective   Physical Examwound ok. Sutures removed    Past Medical History:   Diagnosis Date    Anxiety     Radha's disease     Brain injury     Depression     GERD (gastroesophageal reflux disease)     TBI (traumatic brain injury)        Family History   Problem Relation Age of Onset    Diabetes Father     Cancer Maternal Grandmother     Osteoarthritis Paternal Grandmother        Social History     Tobacco Use    Smoking status: Never     Passive exposure: Never    Smokeless tobacco: Never   Vaping Use    Vaping status: Never Used   Substance Use Topics    Alcohol use: Yes     Comment: occasionally    Drug use: Never       Past Surgical History:   Procedure Laterality Date    ADENOIDECTOMY       SECTION      DENTAL PROCEDURE      GASTROSTOMY FEEDING TUBE INSERTION      NASAL SINUS SURGERY      SCAR REVISION         Current Outpatient Medications   Medication Instructions    Ajovy 225 MG/1.5ML solution prefilled syringe INJECT 1 SYRINGEFUL ONCE EVERY MONTH    busPIRone (BUSPAR) 15 mg, Every 6 Hours PRN    ferrous sulfate 324 (65 Fe) MG tablet delayed-release EC tablet No dose, route, or frequency recorded.    ibuprofen (ADVIL,MOTRIN) 600 mg, 2 Times Daily PRN    ketorolac (TORADOL) 10 MG tablet TAKE 1 TABLET BY MOUTH TWICE DAILY FOR 5 DAYS    meloxicam (MOBIC) 7.5 mg, Daily    naproxen (NAPROSYN) 500 MG tablet No dose, route, or frequency recorded.    Nuedexta 20-10 MG capsule capsule 1 capsule, 2 Times Daily    omeprazole  (priLOSEC) 20 MG capsule No dose, route, or frequency recorded.    rizatriptan (MAXALT) 10 MG tablet TAKE 1 TABLET BY MOUTH AS NEEDED AT ONSET OF HEADACHE    Semaglutide,0.25 or 0.5MG/DOS, (Ozempic, 0.25 or 0.5 MG/DOSE,) 2 MG/1.5ML solution pen-injector Weekly    sertraline (ZOLOFT) 100 mg, Daily    Stool Softener 100 MG capsule No dose, route, or frequency recorded.    tiZANidine (ZANAFLEX) 4 MG tablet TAKE 1 TO 2 TABLETS BY MOUTH THREE TIMES DAILY         Assessment & Plan   Diagnoses and all orders for this visit:    1. Skin lesion (Primary)           Return prn      This document has been electronically signed by Edison Syed MD   February 10, 2025 10:44 EST

## 2025-07-24 ENCOUNTER — HOSPITAL ENCOUNTER (OUTPATIENT)
Dept: ULTRASOUND IMAGING | Facility: HOSPITAL | Age: 35
Discharge: HOME | End: 2025-07-24
Payer: COMMERCIAL

## 2025-07-24 ENCOUNTER — HOSPITAL ENCOUNTER (OUTPATIENT)
Dept: MAMMOGRAPHY | Facility: HOSPITAL | Age: 35
Discharge: HOME | End: 2025-07-24
Payer: COMMERCIAL

## 2025-07-24 DIAGNOSIS — N64.4 BREAST PAIN: ICD-10-CM

## 2025-07-24 DIAGNOSIS — R92.8 ABNORMAL MAMMOGRAM: ICD-10-CM

## 2025-07-24 PROCEDURE — 76642 ULTRASOUND BREAST LIMITED: CPT

## 2025-07-24 PROCEDURE — 77066 DX MAMMO INCL CAD BI: CPT

## 2025-07-24 PROCEDURE — G0279 TOMOSYNTHESIS, MAMMO: HCPCS
